# Patient Record
Sex: MALE | Race: WHITE | Employment: OTHER | URBAN - METROPOLITAN AREA
[De-identification: names, ages, dates, MRNs, and addresses within clinical notes are randomized per-mention and may not be internally consistent; named-entity substitution may affect disease eponyms.]

---

## 2020-03-17 ENCOUNTER — HOSPITAL ENCOUNTER (OUTPATIENT)
Age: 65
Setting detail: OBSERVATION
Discharge: NURSING, REHAB OR LONG-TERM CARE | End: 2020-03-20
Attending: EMERGENCY MEDICINE | Admitting: SURGERY
Payer: MEDICARE

## 2020-03-17 PROCEDURE — 84484 ASSAY OF TROPONIN QUANT: CPT

## 2020-03-17 PROCEDURE — 6360000002 HC RX W HCPCS: Performed by: EMERGENCY MEDICINE

## 2020-03-17 PROCEDURE — 83690 ASSAY OF LIPASE: CPT

## 2020-03-17 PROCEDURE — 83605 ASSAY OF LACTIC ACID: CPT

## 2020-03-17 PROCEDURE — 80053 COMPREHEN METABOLIC PANEL: CPT

## 2020-03-17 PROCEDURE — 93005 ELECTROCARDIOGRAM TRACING: CPT | Performed by: EMERGENCY MEDICINE

## 2020-03-17 PROCEDURE — 96375 TX/PRO/DX INJ NEW DRUG ADDON: CPT

## 2020-03-17 PROCEDURE — 85027 COMPLETE CBC AUTOMATED: CPT

## 2020-03-17 PROCEDURE — 96376 TX/PRO/DX INJ SAME DRUG ADON: CPT

## 2020-03-17 PROCEDURE — 99285 EMERGENCY DEPT VISIT HI MDM: CPT

## 2020-03-17 RX ORDER — ONDANSETRON 2 MG/ML
4 INJECTION INTRAMUSCULAR; INTRAVENOUS EVERY 6 HOURS PRN
Status: DISCONTINUED | OUTPATIENT
Start: 2020-03-17 | End: 2020-03-20 | Stop reason: HOSPADM

## 2020-03-17 RX ORDER — MORPHINE SULFATE 4 MG/ML
4 INJECTION, SOLUTION INTRAMUSCULAR; INTRAVENOUS ONCE
Status: COMPLETED | OUTPATIENT
Start: 2020-03-18 | End: 2020-03-17

## 2020-03-17 RX ORDER — MORPHINE SULFATE 4 MG/ML
4 INJECTION, SOLUTION INTRAMUSCULAR; INTRAVENOUS ONCE
Status: COMPLETED | OUTPATIENT
Start: 2020-03-17 | End: 2020-03-17

## 2020-03-17 RX ORDER — MORPHINE SULFATE 4 MG/ML
INJECTION, SOLUTION INTRAMUSCULAR; INTRAVENOUS
Status: DISPENSED
Start: 2020-03-17 | End: 2020-03-18

## 2020-03-17 RX ADMIN — MORPHINE SULFATE 4 MG: 4 INJECTION, SOLUTION INTRAMUSCULAR; INTRAVENOUS at 23:55

## 2020-03-17 RX ADMIN — ONDANSETRON 4 MG: 2 INJECTION INTRAMUSCULAR; INTRAVENOUS at 23:47

## 2020-03-17 RX ADMIN — MORPHINE SULFATE 4 MG: 4 INJECTION, SOLUTION INTRAMUSCULAR; INTRAVENOUS at 23:48

## 2020-03-17 ASSESSMENT — PAIN DESCRIPTION - DESCRIPTORS: DESCRIPTORS: SHOOTING

## 2020-03-17 ASSESSMENT — PAIN SCALES - GENERAL
PAINLEVEL_OUTOF10: 10

## 2020-03-17 ASSESSMENT — PAIN DESCRIPTION - LOCATION: LOCATION: BACK

## 2020-03-18 ENCOUNTER — APPOINTMENT (OUTPATIENT)
Dept: MRI IMAGING | Age: 65
End: 2020-03-18
Payer: MEDICARE

## 2020-03-18 ENCOUNTER — APPOINTMENT (OUTPATIENT)
Dept: GENERAL RADIOLOGY | Age: 65
End: 2020-03-18
Payer: MEDICARE

## 2020-03-18 ENCOUNTER — APPOINTMENT (OUTPATIENT)
Dept: CT IMAGING | Age: 65
End: 2020-03-18
Payer: MEDICARE
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
## 2020-03-18 LAB
ALBUMIN SERPL-MCNC: 3.6 G/DL (ref 3.5–5.2)
ALP BLD-CCNC: 239 U/L (ref 40–129)
ALT SERPL-CCNC: 10 U/L (ref 0–40)
AMORPHOUS: ABNORMAL
ANION GAP SERPL CALCULATED.3IONS-SCNC: 11 MMOL/L (ref 7–16)
AST SERPL-CCNC: 22 U/L (ref 0–39)
BACTERIA: ABNORMAL /HPF
BILIRUB SERPL-MCNC: 0.3 MG/DL (ref 0–1.2)
BILIRUBIN URINE: NEGATIVE
BLOOD, URINE: ABNORMAL
BUN BLDV-MCNC: 10 MG/DL (ref 8–23)
CALCIUM SERPL-MCNC: 9.4 MG/DL (ref 8.6–10.2)
CHLORIDE BLD-SCNC: 104 MMOL/L (ref 98–107)
CLARITY: CLEAR
CO2: 25 MMOL/L (ref 22–29)
COLOR: YELLOW
CREAT SERPL-MCNC: 1.1 MG/DL (ref 0.7–1.2)
EKG ATRIAL RATE: 80 BPM
EKG P AXIS: 49 DEGREES
EKG P-R INTERVAL: 160 MS
EKG Q-T INTERVAL: 400 MS
EKG QRS DURATION: 86 MS
EKG QTC CALCULATION (BAZETT): 461 MS
EKG R AXIS: 51 DEGREES
EKG T AXIS: 27 DEGREES
EKG VENTRICULAR RATE: 80 BPM
GFR AFRICAN AMERICAN: >60
GFR NON-AFRICAN AMERICAN: >60 ML/MIN/1.73
GLUCOSE BLD-MCNC: 122 MG/DL (ref 74–99)
GLUCOSE URINE: NEGATIVE MG/DL
HCT VFR BLD CALC: 31.9 % (ref 37–54)
HEMOGLOBIN: 9.6 G/DL (ref 12.5–16.5)
KETONES, URINE: NEGATIVE MG/DL
LACTIC ACID: 1.8 MMOL/L (ref 0.5–2.2)
LEUKOCYTE ESTERASE, URINE: ABNORMAL
LIPASE: 79 U/L (ref 13–60)
MCH RBC QN AUTO: 24.6 PG (ref 26–35)
MCHC RBC AUTO-ENTMCNC: 30.1 % (ref 32–34.5)
MCV RBC AUTO: 81.6 FL (ref 80–99.9)
NITRITE, URINE: POSITIVE
PDW BLD-RTO: 13.2 FL (ref 11.5–15)
PH UA: >=9 (ref 5–9)
PLATELET # BLD: 223 E9/L (ref 130–450)
PMV BLD AUTO: 10.5 FL (ref 7–12)
POTASSIUM SERPL-SCNC: 4 MMOL/L (ref 3.5–5)
PROTEIN UA: NEGATIVE MG/DL
RBC # BLD: 3.91 E12/L (ref 3.8–5.8)
RBC UA: ABNORMAL /HPF (ref 0–2)
SODIUM BLD-SCNC: 140 MMOL/L (ref 132–146)
SPECIFIC GRAVITY UA: 1.01 (ref 1–1.03)
TOTAL PROTEIN: 6.7 G/DL (ref 6.4–8.3)
TROPONIN: <0.01 NG/ML (ref 0–0.03)
UROBILINOGEN, URINE: 0.2 E.U./DL
WBC # BLD: 5.7 E9/L (ref 4.5–11.5)
WBC UA: ABNORMAL /HPF (ref 0–5)

## 2020-03-18 PROCEDURE — 72125 CT NECK SPINE W/O DYE: CPT

## 2020-03-18 PROCEDURE — 96375 TX/PRO/DX INJ NEW DRUG ADDON: CPT

## 2020-03-18 PROCEDURE — 81001 URINALYSIS AUTO W/SCOPE: CPT

## 2020-03-18 PROCEDURE — 6360000004 HC RX CONTRAST MEDICATION: Performed by: RADIOLOGY

## 2020-03-18 PROCEDURE — 6370000000 HC RX 637 (ALT 250 FOR IP): Performed by: SURGERY

## 2020-03-18 PROCEDURE — G0378 HOSPITAL OBSERVATION PER HR: HCPCS

## 2020-03-18 PROCEDURE — 96376 TX/PRO/DX INJ SAME DRUG ADON: CPT

## 2020-03-18 PROCEDURE — 6360000002 HC RX W HCPCS: Performed by: SURGERY

## 2020-03-18 PROCEDURE — 70450 CT HEAD/BRAIN W/O DYE: CPT

## 2020-03-18 PROCEDURE — 99219 PR INITIAL OBSERVATION CARE/DAY 50 MINUTES: CPT | Performed by: SURGERY

## 2020-03-18 PROCEDURE — 72141 MRI NECK SPINE W/O DYE: CPT

## 2020-03-18 PROCEDURE — 72131 CT LUMBAR SPINE W/O DYE: CPT

## 2020-03-18 PROCEDURE — 6360000002 HC RX W HCPCS: Performed by: EMERGENCY MEDICINE

## 2020-03-18 PROCEDURE — 96365 THER/PROPH/DIAG IV INF INIT: CPT

## 2020-03-18 PROCEDURE — 72128 CT CHEST SPINE W/O DYE: CPT

## 2020-03-18 PROCEDURE — 74177 CT ABD & PELVIS W/CONTRAST: CPT

## 2020-03-18 PROCEDURE — 6360000002 HC RX W HCPCS: Performed by: NURSE PRACTITIONER

## 2020-03-18 PROCEDURE — 2580000003 HC RX 258: Performed by: SURGERY

## 2020-03-18 PROCEDURE — 6360000002 HC RX W HCPCS: Performed by: STUDENT IN AN ORGANIZED HEALTH CARE EDUCATION/TRAINING PROGRAM

## 2020-03-18 PROCEDURE — 87088 URINE BACTERIA CULTURE: CPT

## 2020-03-18 PROCEDURE — 72148 MRI LUMBAR SPINE W/O DYE: CPT

## 2020-03-18 PROCEDURE — 71045 X-RAY EXAM CHEST 1 VIEW: CPT

## 2020-03-18 PROCEDURE — 72146 MRI CHEST SPINE W/O DYE: CPT

## 2020-03-18 RX ORDER — PERPHENAZINE 2 MG/1
2 TABLET, FILM COATED ORAL 2 TIMES DAILY
Status: DISCONTINUED | OUTPATIENT
Start: 2020-03-18 | End: 2020-03-20 | Stop reason: HOSPADM

## 2020-03-18 RX ORDER — TAMSULOSIN HYDROCHLORIDE 0.4 MG/1
0.4 CAPSULE ORAL DAILY
Status: DISCONTINUED | OUTPATIENT
Start: 2020-03-19 | End: 2020-03-20 | Stop reason: HOSPADM

## 2020-03-18 RX ORDER — PERPHENAZINE 2 MG/1
2 TABLET, FILM COATED ORAL 2 TIMES DAILY
COMMUNITY

## 2020-03-18 RX ORDER — GABAPENTIN 600 MG/1
1200 TABLET ORAL 3 TIMES DAILY
COMMUNITY

## 2020-03-18 RX ORDER — M-VIT,TX,IRON,MINS/CALC/FOLIC 27MG-0.4MG
1 TABLET ORAL DAILY
COMMUNITY

## 2020-03-18 RX ORDER — MORPHINE SULFATE 4 MG/ML
4 INJECTION, SOLUTION INTRAMUSCULAR; INTRAVENOUS ONCE
Status: COMPLETED | OUTPATIENT
Start: 2020-03-18 | End: 2020-03-18

## 2020-03-18 RX ORDER — PROMETHAZINE HYDROCHLORIDE 25 MG/1
12.5 TABLET ORAL EVERY 6 HOURS PRN
Status: DISCONTINUED | OUTPATIENT
Start: 2020-03-18 | End: 2020-03-20 | Stop reason: HOSPADM

## 2020-03-18 RX ORDER — ALPRAZOLAM 1 MG/1
2 TABLET ORAL 3 TIMES DAILY
COMMUNITY

## 2020-03-18 RX ORDER — SENNA AND DOCUSATE SODIUM 50; 8.6 MG/1; MG/1
1 TABLET, FILM COATED ORAL 2 TIMES DAILY
COMMUNITY

## 2020-03-18 RX ORDER — SENNA AND DOCUSATE SODIUM 50; 8.6 MG/1; MG/1
1 TABLET, FILM COATED ORAL 2 TIMES DAILY
Status: DISCONTINUED | OUTPATIENT
Start: 2020-03-18 | End: 2020-03-18 | Stop reason: SDUPTHER

## 2020-03-18 RX ORDER — ROSUVASTATIN CALCIUM 20 MG/1
20 TABLET, COATED ORAL NIGHTLY
Status: DISCONTINUED | OUTPATIENT
Start: 2020-03-18 | End: 2020-03-20 | Stop reason: HOSPADM

## 2020-03-18 RX ORDER — DULOXETIN HYDROCHLORIDE 60 MG/1
60 CAPSULE, DELAYED RELEASE ORAL DAILY
COMMUNITY

## 2020-03-18 RX ORDER — FLUTICASONE PROPIONATE 50 MCG
1 SPRAY, SUSPENSION (ML) NASAL DAILY
Status: DISCONTINUED | OUTPATIENT
Start: 2020-03-19 | End: 2020-03-20 | Stop reason: HOSPADM

## 2020-03-18 RX ORDER — BACLOFEN 10 MG/1
10 TABLET ORAL 3 TIMES DAILY
COMMUNITY

## 2020-03-18 RX ORDER — FAMOTIDINE 20 MG/1
20 TABLET, FILM COATED ORAL 2 TIMES DAILY
COMMUNITY

## 2020-03-18 RX ORDER — ACETAMINOPHEN 325 MG/1
650 TABLET ORAL EVERY 6 HOURS
Status: DISCONTINUED | OUTPATIENT
Start: 2020-03-18 | End: 2020-03-20 | Stop reason: HOSPADM

## 2020-03-18 RX ORDER — CARVEDILOL 3.12 MG/1
3.12 TABLET ORAL 2 TIMES DAILY
COMMUNITY

## 2020-03-18 RX ORDER — OXYCODONE HYDROCHLORIDE 10 MG/1
10 TABLET ORAL EVERY 4 HOURS PRN
Status: DISCONTINUED | OUTPATIENT
Start: 2020-03-18 | End: 2020-03-19

## 2020-03-18 RX ORDER — ALBUTEROL SULFATE 90 UG/1
2 AEROSOL, METERED RESPIRATORY (INHALATION) EVERY 4 HOURS PRN
Status: DISCONTINUED | OUTPATIENT
Start: 2020-03-18 | End: 2020-03-20 | Stop reason: HOSPADM

## 2020-03-18 RX ORDER — SENNA AND DOCUSATE SODIUM 50; 8.6 MG/1; MG/1
1 TABLET, FILM COATED ORAL 2 TIMES DAILY
Status: DISCONTINUED | OUTPATIENT
Start: 2020-03-18 | End: 2020-03-20 | Stop reason: HOSPADM

## 2020-03-18 RX ORDER — IBUPROFEN 400 MG/1
400 TABLET ORAL EVERY 6 HOURS PRN
Status: DISCONTINUED | OUTPATIENT
Start: 2020-03-18 | End: 2020-03-20 | Stop reason: HOSPADM

## 2020-03-18 RX ORDER — METHOCARBAMOL 750 MG/1
1500 TABLET, FILM COATED ORAL 4 TIMES DAILY
Status: DISCONTINUED | OUTPATIENT
Start: 2020-03-18 | End: 2020-03-20 | Stop reason: HOSPADM

## 2020-03-18 RX ORDER — DULOXETIN HYDROCHLORIDE 60 MG/1
60 CAPSULE, DELAYED RELEASE ORAL DAILY
Status: DISCONTINUED | OUTPATIENT
Start: 2020-03-19 | End: 2020-03-20 | Stop reason: HOSPADM

## 2020-03-18 RX ORDER — SODIUM CHLORIDE 9 MG/ML
INJECTION, SOLUTION INTRAVENOUS CONTINUOUS
Status: DISCONTINUED | OUTPATIENT
Start: 2020-03-18 | End: 2020-03-18

## 2020-03-18 RX ORDER — M-VIT,TX,IRON,MINS/CALC/FOLIC 27MG-0.4MG
1 TABLET ORAL DAILY
Status: DISCONTINUED | OUTPATIENT
Start: 2020-03-19 | End: 2020-03-20 | Stop reason: HOSPADM

## 2020-03-18 RX ORDER — IBUPROFEN 400 MG/1
400 TABLET ORAL EVERY 6 HOURS PRN
COMMUNITY

## 2020-03-18 RX ORDER — TAMSULOSIN HYDROCHLORIDE 0.4 MG/1
0.4 CAPSULE ORAL DAILY
COMMUNITY

## 2020-03-18 RX ORDER — FLUTICASONE PROPIONATE 50 MCG
1 SPRAY, SUSPENSION (ML) NASAL DAILY
COMMUNITY

## 2020-03-18 RX ORDER — CARVEDILOL 3.12 MG/1
3.12 TABLET ORAL 2 TIMES DAILY
Status: DISCONTINUED | OUTPATIENT
Start: 2020-03-18 | End: 2020-03-20 | Stop reason: HOSPADM

## 2020-03-18 RX ORDER — ALLOPURINOL 100 MG/1
100 TABLET ORAL DAILY
COMMUNITY

## 2020-03-18 RX ORDER — PREDNISONE 10 MG/1
10 TABLET ORAL DAILY
COMMUNITY

## 2020-03-18 RX ORDER — ALBUTEROL SULFATE 90 UG/1
2 AEROSOL, METERED RESPIRATORY (INHALATION) EVERY 4 HOURS PRN
COMMUNITY

## 2020-03-18 RX ORDER — GABAPENTIN 600 MG/1
1200 TABLET ORAL 3 TIMES DAILY
Status: DISCONTINUED | OUTPATIENT
Start: 2020-03-18 | End: 2020-03-20 | Stop reason: HOSPADM

## 2020-03-18 RX ORDER — SODIUM CHLORIDE 0.9 % (FLUSH) 0.9 %
10 SYRINGE (ML) INJECTION EVERY 12 HOURS SCHEDULED
Status: DISCONTINUED | OUTPATIENT
Start: 2020-03-18 | End: 2020-03-20 | Stop reason: HOSPADM

## 2020-03-18 RX ORDER — NITROFURANTOIN 25; 75 MG/1; MG/1
100 CAPSULE ORAL DAILY
COMMUNITY

## 2020-03-18 RX ORDER — LEVETIRACETAM 500 MG/1
500 TABLET ORAL DAILY
COMMUNITY

## 2020-03-18 RX ORDER — ALLOPURINOL 100 MG/1
100 TABLET ORAL DAILY
Status: DISCONTINUED | OUTPATIENT
Start: 2020-03-19 | End: 2020-03-20 | Stop reason: HOSPADM

## 2020-03-18 RX ORDER — BACLOFEN 10 MG/1
10 TABLET ORAL 3 TIMES DAILY
Status: DISCONTINUED | OUTPATIENT
Start: 2020-03-18 | End: 2020-03-20 | Stop reason: HOSPADM

## 2020-03-18 RX ORDER — ALPRAZOLAM 1 MG/1
2 TABLET ORAL 3 TIMES DAILY
Status: DISCONTINUED | OUTPATIENT
Start: 2020-03-18 | End: 2020-03-20 | Stop reason: HOSPADM

## 2020-03-18 RX ORDER — SUCRALFATE 1 G/1
1 TABLET ORAL 4 TIMES DAILY
COMMUNITY

## 2020-03-18 RX ORDER — POLYETHYLENE GLYCOL 3350 17 G/17G
17 POWDER, FOR SOLUTION ORAL DAILY PRN
Status: DISCONTINUED | OUTPATIENT
Start: 2020-03-18 | End: 2020-03-20 | Stop reason: HOSPADM

## 2020-03-18 RX ORDER — LORAZEPAM 2 MG/ML
1 INJECTION INTRAMUSCULAR ONCE
Status: DISCONTINUED | OUTPATIENT
Start: 2020-03-18 | End: 2020-03-20 | Stop reason: HOSPADM

## 2020-03-18 RX ORDER — FAMOTIDINE 20 MG/1
20 TABLET, FILM COATED ORAL 2 TIMES DAILY
Status: DISCONTINUED | OUTPATIENT
Start: 2020-03-18 | End: 2020-03-20 | Stop reason: HOSPADM

## 2020-03-18 RX ORDER — SUCRALFATE 1 G/1
1 TABLET ORAL 4 TIMES DAILY
Status: DISCONTINUED | OUTPATIENT
Start: 2020-03-18 | End: 2020-03-20 | Stop reason: HOSPADM

## 2020-03-18 RX ORDER — LANOLIN ALCOHOL/MO/W.PET/CERES
1000 CREAM (GRAM) TOPICAL DAILY
Status: DISCONTINUED | OUTPATIENT
Start: 2020-03-19 | End: 2020-03-20 | Stop reason: HOSPADM

## 2020-03-18 RX ORDER — ONDANSETRON 2 MG/ML
4 INJECTION INTRAMUSCULAR; INTRAVENOUS EVERY 6 HOURS PRN
Status: DISCONTINUED | OUTPATIENT
Start: 2020-03-18 | End: 2020-03-20 | Stop reason: HOSPADM

## 2020-03-18 RX ORDER — SODIUM CHLORIDE 0.9 % (FLUSH) 0.9 %
10 SYRINGE (ML) INJECTION PRN
Status: DISCONTINUED | OUTPATIENT
Start: 2020-03-18 | End: 2020-03-20 | Stop reason: HOSPADM

## 2020-03-18 RX ORDER — LORAZEPAM 2 MG/ML
1 INJECTION INTRAMUSCULAR ONCE
Status: COMPLETED | OUTPATIENT
Start: 2020-03-18 | End: 2020-03-18

## 2020-03-18 RX ORDER — LEVETIRACETAM 500 MG/1
500 TABLET ORAL DAILY
Status: DISCONTINUED | OUTPATIENT
Start: 2020-03-19 | End: 2020-03-20 | Stop reason: HOSPADM

## 2020-03-18 RX ORDER — POLYETHYLENE GLYCOL 3350 17 G/17G
17 POWDER, FOR SOLUTION ORAL DAILY
Status: DISCONTINUED | OUTPATIENT
Start: 2020-03-18 | End: 2020-03-20 | Stop reason: HOSPADM

## 2020-03-18 RX ORDER — OXYCODONE HYDROCHLORIDE 5 MG/1
5 TABLET ORAL EVERY 4 HOURS PRN
Status: DISCONTINUED | OUTPATIENT
Start: 2020-03-18 | End: 2020-03-20 | Stop reason: HOSPADM

## 2020-03-18 RX ADMIN — LORAZEPAM 1 MG: 2 INJECTION, SOLUTION INTRAMUSCULAR; INTRAVENOUS at 06:35

## 2020-03-18 RX ADMIN — METHOCARBAMOL TABLETS 1500 MG: 750 TABLET, COATED ORAL at 14:38

## 2020-03-18 RX ADMIN — SODIUM CHLORIDE, PRESERVATIVE FREE 10 ML: 5 INJECTION INTRAVENOUS at 10:41

## 2020-03-18 RX ADMIN — SENNOSIDES AND DOCUSATE SODIUM 1 TABLET: 8.6; 5 TABLET ORAL at 10:39

## 2020-03-18 RX ADMIN — OXYCODONE HYDROCHLORIDE 10 MG: 10 TABLET ORAL at 18:36

## 2020-03-18 RX ADMIN — ACETAMINOPHEN 650 MG: 325 TABLET ORAL at 21:08

## 2020-03-18 RX ADMIN — OXYCODONE HYDROCHLORIDE 10 MG: 10 TABLET ORAL at 22:40

## 2020-03-18 RX ADMIN — ALPRAZOLAM 2 MG: 1 TABLET ORAL at 22:40

## 2020-03-18 RX ADMIN — ACETAMINOPHEN 650 MG: 325 TABLET ORAL at 10:39

## 2020-03-18 RX ADMIN — OXYCODONE HYDROCHLORIDE 10 MG: 10 TABLET ORAL at 14:57

## 2020-03-18 RX ADMIN — OXYCODONE HYDROCHLORIDE 10 MG: 10 TABLET ORAL at 10:39

## 2020-03-18 RX ADMIN — POLYETHYLENE GLYCOL 3350 17 G: 17 POWDER, FOR SOLUTION ORAL at 10:39

## 2020-03-18 RX ADMIN — SUCRALFATE 1 G: 1 TABLET ORAL at 22:40

## 2020-03-18 RX ADMIN — MORPHINE SULFATE 4 MG: 4 INJECTION, SOLUTION INTRAMUSCULAR; INTRAVENOUS at 08:00

## 2020-03-18 RX ADMIN — ACETAMINOPHEN 650 MG: 325 TABLET ORAL at 16:51

## 2020-03-18 RX ADMIN — SODIUM CHLORIDE: 9 INJECTION, SOLUTION INTRAVENOUS at 10:39

## 2020-03-18 RX ADMIN — IOPAMIDOL 110 ML: 755 INJECTION, SOLUTION INTRAVENOUS at 01:12

## 2020-03-18 RX ADMIN — MORPHINE SULFATE 4 MG: 4 INJECTION, SOLUTION INTRAMUSCULAR; INTRAVENOUS at 03:38

## 2020-03-18 RX ADMIN — METHOCARBAMOL TABLETS 1500 MG: 750 TABLET, COATED ORAL at 16:51

## 2020-03-18 RX ADMIN — METHOCARBAMOL TABLETS 1500 MG: 750 TABLET, COATED ORAL at 21:08

## 2020-03-18 RX ADMIN — GABAPENTIN 1200 MG: 600 TABLET, FILM COATED ORAL at 22:39

## 2020-03-18 RX ADMIN — FAMOTIDINE 20 MG: 20 TABLET, FILM COATED ORAL at 22:40

## 2020-03-18 RX ADMIN — METHOCARBAMOL 1000 MG: 100 INJECTION, SOLUTION INTRAMUSCULAR; INTRAVENOUS at 04:08

## 2020-03-18 RX ADMIN — SODIUM CHLORIDE, PRESERVATIVE FREE 10 ML: 5 INJECTION INTRAVENOUS at 21:08

## 2020-03-18 ASSESSMENT — PAIN SCALES - GENERAL
PAINLEVEL_OUTOF10: 7
PAINLEVEL_OUTOF10: 7
PAINLEVEL_OUTOF10: 10
PAINLEVEL_OUTOF10: 10
PAINLEVEL_OUTOF10: 8
PAINLEVEL_OUTOF10: 8
PAINLEVEL_OUTOF10: 0
PAINLEVEL_OUTOF10: 8
PAINLEVEL_OUTOF10: 8
PAINLEVEL_OUTOF10: 7
PAINLEVEL_OUTOF10: 10
PAINLEVEL_OUTOF10: 7
PAINLEVEL_OUTOF10: 8
PAINLEVEL_OUTOF10: 8

## 2020-03-18 ASSESSMENT — PAIN DESCRIPTION - FREQUENCY
FREQUENCY: CONTINUOUS

## 2020-03-18 ASSESSMENT — PAIN DESCRIPTION - DESCRIPTORS
DESCRIPTORS: DISCOMFORT;CONSTANT;DULL
DESCRIPTORS: SHARP;ACHING
DESCRIPTORS: DISCOMFORT;CONSTANT
DESCRIPTORS: ACHING;THROBBING
DESCRIPTORS: DISCOMFORT;CONSTANT;ACHING
DESCRIPTORS: ACHING;RADIATING;SORE
DESCRIPTORS: ACHING;DISCOMFORT;SORE
DESCRIPTORS: ACHING;CONSTANT;DISCOMFORT

## 2020-03-18 ASSESSMENT — PAIN DESCRIPTION - ONSET
ONSET: ON-GOING

## 2020-03-18 ASSESSMENT — PAIN DESCRIPTION - LOCATION
LOCATION: BACK;NECK
LOCATION: BACK
LOCATION: BACK;NECK

## 2020-03-18 ASSESSMENT — PAIN DESCRIPTION - PROGRESSION
CLINICAL_PROGRESSION: NOT CHANGED

## 2020-03-18 ASSESSMENT — PAIN DESCRIPTION - PAIN TYPE
TYPE: CHRONIC PAIN
TYPE: ACUTE PAIN
TYPE: CHRONIC PAIN

## 2020-03-18 NOTE — PROGRESS NOTES
Trauma Tertiary Survey    Admit Date: 3/17/2020  Hospital day 1    Other fall from wheelchair    CC:  Back pain, right thigh pain       Past Medical History:   Diagnosis Date    Kidney stones     MI, old     Paraplegia ([facility] [address]        Alcohol pre-screening:  Men: How many times in the past year have you had 5 or more drinks in a day?  none    Scheduled Meds:   sodium chloride flush  10 mL Intravenous 2 times per day    acetaminophen  650 mg Oral Q6H    methocarbamol  1,500 mg Oral 4x Daily    sennosides-docusate sodium  1 tablet Oral BID    polyethylene glycol  17 g Oral Daily    LORazepam  1 mg Intravenous Once     Continuous Infusions:  PRN Meds:sodium chloride flush, polyethylene glycol, promethazine **OR** ondansetron, oxyCODONE **OR** oxyCODONE, ondansetron    Subjective:   Frustrated about his care at his group home, states repeatedly that he wants to move. He complains of arm weakness, severe neck and low back pain, and pain in his right thigh. He states his chronic indwelling figueroa was supposed to be changed 3 weeks ago by his group home but was not; had been placed by his urologist 4 weeks prior to that. Objective:     Patient Vitals for the past 8 hrs:   BP Temp Temp src Pulse SpO2   03/18/20 1646 126/74 97.9 °F (36.6 °C) Temporal 95 96 %       I/O last 3 completed shifts: In: 10 [I.V.:10]  Out: 700 [Urine:700]  No intake/output data recorded. Past Medical History:   Diagnosis Date    Kidney stones     MI, old     Paraplegia [facility])        Radiology:  MRI THORACIC SPINE WO CONTRAST   Final Result   No evidence of an acute osseous injury. 2. Calcified right-sided disc protrusion at T10-11 encroaching on the   lateral recess. Such lesions are usually nonacute. MRI LUMBAR SPINE WO CONTRAST   Final Result   Degenerative spondylotic changes. No fracture or dislocation. No other   significant abnormal findings. MRI CERVICAL SPINE WO CONTRAST   Final Result      1. No acute fracture. Left decreased Right decreased  Wiggles toes: Left No    Right No  Plantar flexion: Left no  Right no    BP (!) 100/57   Pulse 91   Temp 99.1 °F (37.3 °C) (Temporal)   Resp 18   Ht 5' 8\" (1.727 m)   Wt 215 lb (97.5 kg)   SpO2 95%   BMI 32.69 kg/m²     General appearance: awake, conversant, laying in bed in no distress. Tangential answers to any questions  HEENT: collar in place  Lungs: nonlabored breathing on room air   Heart: regular rate  Abdomen: soft, non-tender, non distended  Skin: No wounds or bruising noted  Neurologic: Alert and oriented x 3. Grossly normal  Musculoskeletal: Gestures normally with upper extremities when talking but weak hand grasp and upper extremity strength when prompted. No motor function lower extremities, at baseline  : figueroa present on arrival from facility, cloud urine     Spine:     Spine Tenderness ROM   Cervical 4 /10 Not Indicated   Thoracic 1 /10 Not Indicated   Lumbar 5 /10 Not Indicated     Musculoskeletal    Joint Tenderness Swelling ROM   Right shoulder absent absent normal   Left shoulder absent absent normal   Right elbow absent absent normal   Left elbow absent absent normal   Right wrist absent absent normal   Left wrist absent absent normal   Right hand grasp absent absent abnormal - decreased   Left hand grasp absent absent abnormal - decreased   Right hip present absent abnormal   Left hip absent absent abnormal   Right knee absent absent abnormal   Left knee absent absent abnormal   Right ankle absent absent abnormal   Left ankle absent absent abnormal - absent   Right foot absent absent abnormal - absent   Left foot absent absent abnormal - absent       CONSULTS: Urology    PROCEDURES: none    INJURIES:  Cervical strain      Active Problems:    Accidental fall from chair    Head injury  Resolved Problems:    * No resolved hospital problems. *        Assessment/Plan:       · Neuro:  GCS 15. History of chronic pain. Pain control.  Upper extremity weakness

## 2020-03-18 NOTE — CARE COORDINATION
3/18, OSKAR made referral to Dulce Maria Gardiner at [facility] on patient. Dulce Maria Gardiner to take a look at patient and let OSKAR know on referral.  OSKAR/FRANCINE to follow.
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
## 2020-03-18 NOTE — ED NOTES
Patient screaming \"I was told I was getting pain medication over an hour ago\". Informed patient that I will get the medication.       Gale Luis RN  03/18/20 9932

## 2020-03-18 NOTE — CARE COORDINATION
Transition of care at discharge: Patient is a resident of [address] [place] [address],[address]. Message left for Jazmín Brown to return call in order to discuss discharge plan.

## 2020-03-18 NOTE — PROGRESS NOTES
Date: 3/18/2020       Patient Name: Tonio White  : 1955      MRN: [id number]    PT order received and chart reviewed. Pt declining physical therapy evaluation.    Will follow up as appropriate    Suzie Pineda PT, DPT  [id number]

## 2020-03-18 NOTE — CONSULTS
3/18/2020 6:54 PM  Service: Urology  Group: [facility]    Osiel Herndon  [id number]     Chief Complaint:  Figueroa exchange, history of urethral stricture dilation in February     History of Present Illness: The patient is a 60 y.o. male patient who was admitted to the hospital one day ago after falling out of his wheelchair. He has a history of paraplegia and neurogenic bladder with indwelling figueroa catheter. We were asked to see him for figueroa catheter exchange. He has history of performing intermittent self catheterization in the past. He states he has seen Urology in [place], as well as Dr. Justyna Patel in [place]. He was recently admitted in February for UTI and sepsis. He underwent cystoscopy, urethral dilation, and figueroa catheter placement with Dr. Justyna Patel on 2/5/20 and states his figueroa was due to be changed about 3 weeks ago. Past Medical History:   Diagnosis Date    Kidney stones     MI, old     Paraplegia ([facility] [address]          Past Surgical History:   Procedure Laterality Date    BACK SURGERY         Medications Prior to Admission:    Medications Prior to Admission: senna-docusate (PERICOLACE) 8.6-50 MG per tablet, Take 1 tablet by mouth daily. NONFORMULARY,     Allergies:    Patient has no known allergies. Social History:    reports that he has never smoked. He has never used smokeless tobacco. He reports that he does not drink alcohol or use drugs. Family History:   Non-contributory to this Urological problem  family history is not on file.     Review of Systems:  Constitutional: No fever or chills   Respiratory: negative for cough and hemoptysis  Cardiovascular: negative for chest pain and dyspnea  Gastrointestinal: negative for abdominal pain, diarrhea, nausea and vomiting   Derm: negative for rash and skin lesion(s)  Neurological: negative for seizures and tremors  Musculoskeletal: paraplegia     Psychiatric: Negative   : As above in the HPI, otherwise negative  All other reviews are negative    Physical Exam:     Vitals:  /74   Pulse 95   Temp 97.9 °F (36.6 °C) (Temporal)   Resp 20   Ht 5' 8\" (1.727 m)   Wt 215 lb (97.5 kg)   SpO2 96%   BMI 32.69 kg/m²     General:  Awake, alert, oriented X 3. No apparent distress. HEENT:  Normocephalic, c-collar   Lungs:  Respirations symmetric and non-labored. Abdomen:  soft, nontender, no masses  Extremities:  No clubbing, cyanosis, or edema  Skin:  Warm and dry, no open lesions or rashes  Neuro: paraplegia    Rectal: deferred  Genitourinary: Figueroa intact draining clear yellow urine, appears to have meatotomy     Labs:     Recent Labs     03/17/20  2338   WBC 5.7   RBC 3.91   HGB 9.6*   HCT 31.9*   MCV 81.6   MCH 24.6*   MCHC 30.1*   RDW 13.2      MPV 10.5         Recent Labs     03/17/20  2338   CREATININE 1.1       Imaging:             Procedures:   10cc of water was removed from figueroa balloon and figueroa was removed. The genitalia were prep and draped in sterile fashion, a Urojet was inserted into the urethra and a #18F coude catheter was inserted into the bladder. This was irrigated for and clear yellow urine returned. He tolerated the procedure well     Assessment/plan:  Neurogenic bladder with indwelling figueroa  Hx of urethral stricture s/p cystoscopy, urethral dilation, and figueroa placement on 2/5/20 with Dr. Charlies Fothergill    Urine culture ordered   No leukocytosis, no fever, recommend treatment based off of positive culture results. CT reviewed, no obstructive uropathy   Bladder scan ordered to ensure no retention with figueroa placement   Continue figueroa catheter  This will continue to be changed every 4 weeks. We will cont to follow               Electronically signed by  on 3/18/2020 at 6:54 PM   I interviewed and examined the patient and feel that the catheter is well-positioned  I agree with the above note and plan by nurse Cintia Shafer.

## 2020-03-18 NOTE — PROGRESS NOTES
Occupational Therapy          OT consult received and appreciated. Chart reviewed. Will hold evaluation due to patient adamantly refusing to work with therapy due to UTI and pain . Will evaluate at a later time. Thank you.  Angel Luis Batista, OTR/L #[id number]

## 2020-03-19 LAB
ALBUMIN SERPL-MCNC: 3.2 G/DL (ref 3.5–5.2)
ALP BLD-CCNC: 233 U/L (ref 40–129)
ALT SERPL-CCNC: 10 U/L (ref 0–40)
ANION GAP SERPL CALCULATED.3IONS-SCNC: 10 MMOL/L (ref 7–16)
AST SERPL-CCNC: 18 U/L (ref 0–39)
BILIRUB SERPL-MCNC: 0.3 MG/DL (ref 0–1.2)
BUN BLDV-MCNC: 10 MG/DL (ref 8–23)
CALCIUM SERPL-MCNC: 8.8 MG/DL (ref 8.6–10.2)
CHLORIDE BLD-SCNC: 105 MMOL/L (ref 98–107)
CO2: 26 MMOL/L (ref 22–29)
CREAT SERPL-MCNC: 1 MG/DL (ref 0.7–1.2)
GFR AFRICAN AMERICAN: >60
GFR NON-AFRICAN AMERICAN: >60 ML/MIN/1.73
GLUCOSE BLD-MCNC: 87 MG/DL (ref 74–99)
HCT VFR BLD CALC: 31.8 % (ref 37–54)
HEMOGLOBIN: 9.4 G/DL (ref 12.5–16.5)
MCH RBC QN AUTO: 25 PG (ref 26–35)
MCHC RBC AUTO-ENTMCNC: 29.6 % (ref 32–34.5)
MCV RBC AUTO: 84.6 FL (ref 80–99.9)
PDW BLD-RTO: 13.2 FL (ref 11.5–15)
PLATELET # BLD: 192 E9/L (ref 130–450)
PMV BLD AUTO: 10.4 FL (ref 7–12)
POTASSIUM REFLEX MAGNESIUM: 3.6 MMOL/L (ref 3.5–5)
RBC # BLD: 3.76 E12/L (ref 3.8–5.8)
SODIUM BLD-SCNC: 141 MMOL/L (ref 132–146)
TOTAL PROTEIN: 6 G/DL (ref 6.4–8.3)
WBC # BLD: 4.6 E9/L (ref 4.5–11.5)

## 2020-03-19 PROCEDURE — G0378 HOSPITAL OBSERVATION PER HR: HCPCS

## 2020-03-19 PROCEDURE — 99224 PR SBSQ OBSERVATION CARE/DAY 15 MINUTES: CPT | Performed by: SURGERY

## 2020-03-19 PROCEDURE — 6370000000 HC RX 637 (ALT 250 FOR IP): Performed by: SURGERY

## 2020-03-19 PROCEDURE — 36415 COLL VENOUS BLD VENIPUNCTURE: CPT

## 2020-03-19 PROCEDURE — 97161 PT EVAL LOW COMPLEX 20 MIN: CPT

## 2020-03-19 PROCEDURE — 80053 COMPREHEN METABOLIC PANEL: CPT

## 2020-03-19 PROCEDURE — 85027 COMPLETE CBC AUTOMATED: CPT

## 2020-03-19 PROCEDURE — 51798 US URINE CAPACITY MEASURE: CPT

## 2020-03-19 PROCEDURE — 2580000003 HC RX 258: Performed by: SURGERY

## 2020-03-19 PROCEDURE — 97165 OT EVAL LOW COMPLEX 30 MIN: CPT

## 2020-03-19 RX ADMIN — ACETAMINOPHEN 650 MG: 325 TABLET ORAL at 09:59

## 2020-03-19 RX ADMIN — OXYCODONE HYDROCHLORIDE 10 MG: 10 TABLET ORAL at 04:44

## 2020-03-19 RX ADMIN — ALPRAZOLAM 2 MG: 1 TABLET ORAL at 09:08

## 2020-03-19 RX ADMIN — METHOCARBAMOL TABLETS 1500 MG: 750 TABLET, COATED ORAL at 21:44

## 2020-03-19 RX ADMIN — SODIUM CHLORIDE, PRESERVATIVE FREE 10 ML: 5 INJECTION INTRAVENOUS at 21:44

## 2020-03-19 RX ADMIN — GABAPENTIN 1200 MG: 600 TABLET, FILM COATED ORAL at 21:45

## 2020-03-19 RX ADMIN — FAMOTIDINE 20 MG: 20 TABLET, FILM COATED ORAL at 21:43

## 2020-03-19 RX ADMIN — OXYCODONE 5 MG: 5 TABLET ORAL at 21:43

## 2020-03-19 RX ADMIN — PERPHENAZINE 2 MG: 2 TABLET, FILM COATED ORAL at 21:44

## 2020-03-19 RX ADMIN — PERPHENAZINE 2 MG: 2 TABLET, FILM COATED ORAL at 09:14

## 2020-03-19 RX ADMIN — SUCRALFATE 1 G: 1 TABLET ORAL at 09:12

## 2020-03-19 RX ADMIN — TAMSULOSIN HYDROCHLORIDE 0.4 MG: 0.4 CAPSULE ORAL at 09:11

## 2020-03-19 RX ADMIN — OXYCODONE HYDROCHLORIDE 10 MG: 10 TABLET ORAL at 17:50

## 2020-03-19 RX ADMIN — METHOCARBAMOL TABLETS 1500 MG: 750 TABLET, COATED ORAL at 17:56

## 2020-03-19 RX ADMIN — ACETAMINOPHEN 650 MG: 325 TABLET ORAL at 15:16

## 2020-03-19 RX ADMIN — CARVEDILOL 3.12 MG: 3.12 TABLET, FILM COATED ORAL at 09:12

## 2020-03-19 RX ADMIN — ROSUVASTATIN 20 MG: 20 TABLET, FILM COATED ORAL at 21:45

## 2020-03-19 RX ADMIN — BACLOFEN 10 MG: 10 TABLET ORAL at 21:43

## 2020-03-19 RX ADMIN — SUCRALFATE 1 G: 1 TABLET ORAL at 17:56

## 2020-03-19 RX ADMIN — Medication 1000 MCG: at 09:12

## 2020-03-19 RX ADMIN — FAMOTIDINE 20 MG: 20 TABLET, FILM COATED ORAL at 09:10

## 2020-03-19 RX ADMIN — BACLOFEN 10 MG: 10 TABLET ORAL at 09:13

## 2020-03-19 RX ADMIN — LEVETIRACETAM 500 MG: 500 TABLET ORAL at 09:11

## 2020-03-19 RX ADMIN — OXYCODONE HYDROCHLORIDE 10 MG: 10 TABLET ORAL at 13:36

## 2020-03-19 RX ADMIN — FLUTICASONE PROPIONATE 1 SPRAY: 50 SPRAY, METERED NASAL at 09:13

## 2020-03-19 RX ADMIN — OXYCODONE HYDROCHLORIDE 10 MG: 10 TABLET ORAL at 09:08

## 2020-03-19 RX ADMIN — ALLOPURINOL 100 MG: 100 TABLET ORAL at 09:16

## 2020-03-19 RX ADMIN — METHOCARBAMOL TABLETS 1500 MG: 750 TABLET, COATED ORAL at 09:13

## 2020-03-19 RX ADMIN — ALPRAZOLAM 2 MG: 1 TABLET ORAL at 21:43

## 2020-03-19 RX ADMIN — DULOXETINE HYDROCHLORIDE 60 MG: 60 CAPSULE, DELAYED RELEASE ORAL at 09:12

## 2020-03-19 RX ADMIN — SUCRALFATE 1 G: 1 TABLET ORAL at 14:34

## 2020-03-19 RX ADMIN — ALPRAZOLAM 2 MG: 1 TABLET ORAL at 13:36

## 2020-03-19 RX ADMIN — MULTIPLE VITAMINS W/ MINERALS TAB 1 TABLET: TAB at 09:17

## 2020-03-19 RX ADMIN — CARVEDILOL 3.12 MG: 3.12 TABLET, FILM COATED ORAL at 21:44

## 2020-03-19 RX ADMIN — GABAPENTIN 1200 MG: 600 TABLET, FILM COATED ORAL at 13:36

## 2020-03-19 RX ADMIN — METHOCARBAMOL TABLETS 1500 MG: 750 TABLET, COATED ORAL at 14:33

## 2020-03-19 RX ADMIN — GABAPENTIN 1200 MG: 600 TABLET, FILM COATED ORAL at 09:11

## 2020-03-19 RX ADMIN — SENNOSIDES AND DOCUSATE SODIUM 1 TABLET: 8.6; 5 TABLET ORAL at 09:10

## 2020-03-19 RX ADMIN — SODIUM CHLORIDE, PRESERVATIVE FREE 10 ML: 5 INJECTION INTRAVENOUS at 09:14

## 2020-03-19 RX ADMIN — BACLOFEN 10 MG: 10 TABLET ORAL at 13:36

## 2020-03-19 RX ADMIN — SUCRALFATE 1 G: 1 TABLET ORAL at 21:43

## 2020-03-19 RX ADMIN — ACETAMINOPHEN 650 MG: 325 TABLET ORAL at 04:04

## 2020-03-19 RX ADMIN — ACETAMINOPHEN 650 MG: 325 TABLET ORAL at 21:43

## 2020-03-19 ASSESSMENT — PAIN DESCRIPTION - DESCRIPTORS
DESCRIPTORS: ACHING;CONSTANT;BURNING;THROBBING
DESCRIPTORS: ACHING;CONSTANT;BURNING;THROBBING
DESCRIPTORS: ACHING;BURNING;CONSTANT;THROBBING
DESCRIPTORS: ACHING;CONSTANT;BURNING;THROBBING

## 2020-03-19 ASSESSMENT — PAIN DESCRIPTION - PROGRESSION
CLINICAL_PROGRESSION: NOT CHANGED

## 2020-03-19 ASSESSMENT — PAIN DESCRIPTION - LOCATION
LOCATION: BACK;LEG

## 2020-03-19 ASSESSMENT — PAIN SCALES - GENERAL
PAINLEVEL_OUTOF10: 8
PAINLEVEL_OUTOF10: 6
PAINLEVEL_OUTOF10: 6
PAINLEVEL_OUTOF10: 4
PAINLEVEL_OUTOF10: 5
PAINLEVEL_OUTOF10: 5
PAINLEVEL_OUTOF10: 6
PAINLEVEL_OUTOF10: 9
PAINLEVEL_OUTOF10: 9
PAINLEVEL_OUTOF10: 8
PAINLEVEL_OUTOF10: 8
PAINLEVEL_OUTOF10: 5
PAINLEVEL_OUTOF10: 9

## 2020-03-19 ASSESSMENT — PAIN DESCRIPTION - ONSET
ONSET: ON-GOING

## 2020-03-19 ASSESSMENT — PAIN DESCRIPTION - PAIN TYPE
TYPE: CHRONIC PAIN

## 2020-03-19 ASSESSMENT — PAIN DESCRIPTION - FREQUENCY
FREQUENCY: CONTINUOUS

## 2020-03-19 ASSESSMENT — PAIN - FUNCTIONAL ASSESSMENT
PAIN_FUNCTIONAL_ASSESSMENT: PREVENTS OR INTERFERES SOME ACTIVE ACTIVITIES AND ADLS

## 2020-03-19 NOTE — CONSULTS
cervical,  thoracic and lumbar. They are negative for acute injuries, so I see no  acute neurosurgical injuries. I do not see any evidence of an unstable  spine. He can have his activity advanced as tolerated. Follow-up  outpatient with pain management, so from a Neurosurgery standpoint, he  could be discharged.         Maya Boyer MD    D: 03/19/2020 11:54:26       T: 03/19/2020 12:03:13     [email or web address]  Job#: [id number]     Doc#: [id number]    CC:

## 2020-03-19 NOTE — CARE COORDINATION
CM Note: Met with patient regarding his inability to return to [facility]. I explained to him that he requires a facility and that I have to make choices. We discussed the following and referrals were made to: Liz Motley left for Jessy). [facility]( message left for Kalin Gill). [facility]( message left for [facility] elizabeth). [facility]. [facility] reviewing), [facility](Antwon looking at patient), [address]( Karyn looking at patient). Explained the process to the patient multiple times. He states he understands. Will await responses. The Plan for Transition of Care is related to the following treatment goals: rehab    The Patient and/or patient representative was provided with a choice of provider and agrees   with the discharge plan. [x] Yes [] No    Freedom of choice list was provided with basic dialogue that supports the patient's individualized plan of care/goals, treatment preferences and shares the quality data associated with the providers.  [x] Yes [] No

## 2020-03-19 NOTE — DISCHARGE SUMMARY
Physician Discharge Summary     Patient ID:  Dominik Winston  [id number]  24 y.o.  1955    Admit date: 3/17/2020  Discharge date and time: 3/20/20    Admitting Physician: Jessica Lopez MD     Admission Diagnoses: Fall from chair, initial encounter [W07. XXXA]  Discharge Diagnoses: Active Problems:    Accidental fall from chair    Head injury  Resolved Problems:    * No resolved hospital problems. *      Admission Condition: fair  Discharged Condition: stable    Indication for Admission: fall out of wheelchair, possible LOC, diffuse back pain with weak [address] [facility] Course/Procedures/Operation/Treatments:   3/18: presented as trauma consult for fall out of wheelchair while trying to put on pants. Hit head with possible brief LOC. Neck/back pain with worsened BUE weakness/paresthesias from baseline since fall. Pan-scan without chest CT were negative. MRI pan-spine were negative. Urology consulted for chronic figueroa needing changed and recurrent UTI's over last few months. 3/19: awaiting placement - multiple facilities rejecting patient for history of behavior issues. 3/20: awaiting placement - patient feels better with home meds, still has low back pain. Surgeries/Procedures Performed:   Figueroa exchange    Consults:   IP CONSULT TO TRAUMA SURGERY  IP CONSULT TO UROLOGY  IP CONSULT TO SOCIAL WORK  IP CONSULT TO NEUROSURGERY    Significant Diagnostic Studies:   Recent Labs:  CBC:   Lab Results   Component Value Date    WBC 4.6 03/20/2020    RBC 3.72 03/20/2020    HGB 9.1 03/20/2020    HCT 30.8 03/20/2020    MCV 82.8 03/20/2020    MCH 24.5 03/20/2020    MCHC 29.5 03/20/2020    RDW 13.2 03/20/2020     03/20/2020     BMP:    Lab Results   Component Value Date    GLUCOSE 90 03/20/2020     03/20/2020    K 3.7 03/20/2020     03/20/2020    CO2 24 03/20/2020    ANIONGAP 11 03/20/2020    BUN 10 03/20/2020    CREATININE 0.9 03/20/2020    CALCIUM 9.0 03/20/2020    LABGLOM >60 03/20/2020    GFRAA Ren NASH.    Ct Cervical Spine Wo Contrast    Result Date: 3/18/2020  PROCEDURE INFORMATION: Exam: CT Cervical Spine Without Contrast Exam date and time: 3/17/2020 11:45 PM Age: 60years old Clinical indication: Injury or trauma; Fall; Additional info: Pain TECHNIQUE: Imaging protocol: Computed tomography images of the cervical spine without contrast. Radiation optimization: All CT scans at this facility use at least one of these dose optimization techniques: automated exposure control; mA and/or kV adjustment per patient size (includes targeted exams where dose is matched to clinical indication); or iterative reconstruction. COMPARISON: No relevant prior studies available. FINDINGS: Vertebrae: No acute fracture or significant subluxation is seen. The occiput, odontoid, and C1 lateral masses are normally visualized. Degenerative changes. Multilevel degenerative changes are present in the cervical spine. Posterior hypertrophic osteophytes and uncovertebral joint hypertrophy contribute to spinal stenosis and foraminal narrowing especially at C4-C5, C5-C6, C6-C7. Anterior spinal fusion is C6-C7 is identified. Hypertrophic anterior osteophytes present at C4, C5, C6, C7. Soft tissues: Paraspinal soft tissues are within normal limits. Lungs: Lung apices are normal.     No acute fracture is seen. This report has been electronically signed by Tayler Eden MD.    Ct Thoracic Spine Wo Contrast    Result Date: 3/18/2020  PROCEDURE INFORMATION: Exam: CT Thoracic Spine Without Contrast Exam date and time: 3/17/2020 11:45 PM Age: 60years old Clinical indication: Injury or trauma; Fall; Initial encounter; Blunt trauma (contusions or hematomas);  Additional info: Pain TECHNIQUE: Imaging protocol: Computed tomography images of the thoracic spine without contrast. Radiation optimization: All CT scans at this facility use at least one of these dose optimization techniques: automated exposure control; mA and/or kV adjustment per patient size (includes targeted exams where dose is matched to clinical indication); or iterative reconstruction. COMPARISON: No relevant prior studies available. FINDINGS: Mild-to-moderate degenerative changes are observed in the thoracic spine consisting mostly of anterior bridging osteophytes. No thoracic spine fracture is seen. Spinal alignment is normal     No thoracic spine fracture. Mild-to-moderate thoracic spine spondylosis is appreciated. This report has been electronically signed by Ward Romero MD.    Ct Lumbar Spine Wo Contrast    Result Date: 3/18/2020  PROCEDURE INFORMATION: Exam: CT Lumbar Spine Without Contrast Exam date and time: 3/17/2020 11:45 PM Age: 60years old Clinical indication: Injury or trauma; Fall; Initial encounter; Blunt trauma (contusions or hematomas); Additional info: Pain TECHNIQUE: Imaging protocol: Computed tomography images of the lumbar spine without contrast. Radiation optimization: All CT scans at this facility use at least one of these dose optimization techniques: automated exposure control; mA and/or kV adjustment per patient size (includes targeted exams where dose is matched to clinical indication); or iterative reconstruction. COMPARISON: No relevant prior studies available. FINDINGS: Moderate degenerative disc disease changes are observed at L5-S1. Mild widening of the L5-S1 disc space is seen anteriorly, which appears chronic. Mild degenerative changes are seen at the other levels of the lumbar spine. No lumbar spine fracture. Spinal alignment is normal.     No lumbar spine fracture. This report has been electronically signed by Ward Romero MD.    Mri Cervical Spine Wo Contrast    Result Date: 3/18/2020  Patient MRN:  [id number] : 1955 Age: 60 years Gender: Male Order Date:  3/18/2020 4:15 AM EXAM: MRI CERVICAL SPINE WO CONTRAST INDICATION:  fall, c-spine pain. Previous neck surgery. COMPARISON: CT cervical spine without contrast 3/18/2020.  FINDINGS:  No evidence for fracture. No bone marrow edema. Diffuse degenerative changes are present. Old anterior cervical discectomy and fusion at C6/C7 with metal hardware and a solidly fused bone plug. Straightening of the lower cervical spine. The cervical cord shows smooth mild narrowing C5-T1. The STIR images demonstrate a tiny focus of increased signal in the cord at the C6/7 level, probably related to old stenosis and chronic cord injury at that level with residual myelomalacia. There is no cord swelling or evidence for mass in the cord. No syrinx. At C1/C2, there is mild dorsal spurring and no spinal stenosis or cord compression. At C2/C3, moderate right lateral spurring and sclerosis. A minimal disc bulge. Tiny left lateral spurs. Mild left facet degeneration. No significant stenosis and no cord compression. At C3/C4, there are tiny posterior lateral corner spurs. No facet degeneration. No disc herniation or subluxation and no cord compression or stenosis. At C4/C5, there is moderate to severe flattening of the disc. Moderate anterior spurs. Mild vertebral body endplate irregularities. Small posterior lateral corner spurs. No significant facet degeneration. A minimal disc bulge and bone spurs causing a mild central stenosis. No cord compression. At C5/C6, there is chronic severe flattening of the disc space with large anterior spur and small posterior spurs. Atrophic disc with mild vacuum degeneration. No disc herniation or significant disc bulge. Normal facet joints. Mild to moderate central stenosis without any cord compression or cord impingement. Moderate bilateral neural foraminal stenosis. At C6/C7, there is old anterior fusion. No edema. Solidly fused bone plug. Straightening of the lower cervical spine. Small left lateral spurs. A tiny amount of remnant posterior lateral left disc at the margin not causing any stenosis. Mild central stenosis. No significant neural foraminal stenosis.  There is currently no cord Indication:   Acute fall, lumbar pain, hx paraplegia fall, lumbar pain, hx paraplegia Comparison: CT lumbar spine 18 March 2020, 0113 hours TECHNIQUE: Multiplanar, multisequence MRI of the lumbar spine was performed without intravenous contrast. FINDINGS: Conus medullaris normal position and appearance. Normal spinal alignment with no evidence of spondylolisthesis. The intervertebral discs are degenerated at L5-S1, most notably. The bone marrow is normal in signal. The visualized soft tissues are unremarkable. At L1-2: No central stenosis or neural foraminal narrowing. At L2-3: No central stenosis or neural foraminal narrowing. At L3-4: No central stenosis or neural foraminal narrowing. At L4-5: No central stenosis or neural foraminal narrowing. Degenerative facet arthropathy noted. At L5-S1: No central stenosis or neural foraminal narrowing. Degenerative facet arthropathy noted. Degenerative spondylotic changes. No fracture or dislocation. No other significant abnormal findings. Ct Abdomen Pelvis W Iv Contrast Additional Contrast? None    Result Date: 3/18/2020  PROCEDURE INFORMATION: Exam: CT Abdomen And Pelvis With Contrast Exam date and time: 3/17/2020 11:45 PM Age: 60years old Clinical indication: Injury or trauma; Fall; Initial encounter; Blunt; Additional info: Abdominal pain TECHNIQUE: Imaging protocol: Computed tomography of the abdomen and pelvis with intravenous contrast. Radiation optimization: All CT scans at this facility use at least one of these dose optimization techniques: automated exposure control; mA and/or kV adjustment per patient size (includes targeted exams where dose is matched to clinical indication); or iterative reconstruction. Contrast material: ISOVUE 370; Contrast volume: 110 ml; Contrast route: IV;  COMPARISON: No relevant prior studies available. FINDINGS: Tubes, catheters and devices: A catheter is present in the urinary bladder.  Lungs: Numerous subcentimeter nodules are seen in both lungs. Bibasilar atelectasis is also noted. Heart: The heart is normal in size. A small 2.8 cm left pericardial cyst versus pericardial diverticulum is appreciated. A small pericardial effusion is also noted. Liver: The liver demonstrates a nodular surface contour suggestive of cirrhosis. No parenchymal lesion is seen. Gallbladder and bile ducts: Normal. No calcified stones. No ductal dilation. Pancreas: Normal. No ductal dilation. Spleen: The spleen is mildly enlarged. Adrenals: Normal. No mass. Kidneys and ureters: Normal. No hydronephrosis. Stomach and bowel: A large amount of stool is present in the colon. No intestinal obstruction. Appendix: The appendix is normal. Intraperitoneal space: Unremarkable. No free air. No significant fluid collection. Vasculature: Unremarkable. No abdominal aortic aneurysm. Lymph nodes: Unremarkable. No enlarged lymph nodes. Bladder: Urinary bladder wall thickening is appreciated. Reproductive: Unremarkable as visualized. Bones/joints: Degenerative changes are observed in the lumbar spine and visualized thoracic spine. No acute fracture. Chronic AVN changes are present in the right femoral head. Soft tissues: A small left indirect inguinal hernia containing fat is noted. 1. No evidence of acute traumatic injury in the abdomen or pelvis. 2. Possible hepatic cirrhosis, correlate with LFTs. Mild splenomegaly is noted. 3. Numerous bibasilar subcentimeter nodules. Correlate with prior imaging if available, or consider CT scan of the thorax for further evaluation. 4. Constipation. 5. Possible cystitis, correlate with urinalysis. 6. Mild distal esophagitis.  This report has been electronically signed by Roslyn Cesar MD.    Xr Chest Portable    Result Date: 3/18/2020  Patient MRN:  [id number] : 1955 Age: 60 years Gender: Male Order Date:  3/17/2020 11:30 PM EXAM: XR CHEST PORTABLE one image INDICATION:  fall fall COMPARISON: 3/13/2015 FINDINGS: There is borderline cardiomegaly with the prominence of the superior mediastinum. There is minimal atelectasis in the lung bases. Tiny pleural effusions are suspected. There is no focal consolidation or pneumothorax. Borderline cardiac size with minimal atelectasis in the lung bases. Discharge Exam:     I/O last 3 completed shifts: In: 5 [P.O.:720]  Out: 2800 [Urine:2800]     Gen - no apparent distress   Neuro - Awake, alert, attentive    HEENT - PERRL 3mm   Lungs - non labored,   Heart - RR   Abdomen - snt  Spine -   Mild tenderness   SkIn CDI       Disposition: SNF    In process/preliminary results:  Outstanding Order Results     Date and Time Order Name Status Description    3/18/2020 2154 Culture, Urine Preliminary           Patient Instructions:   Discharge Medication List as of 3/20/2020  2:45 PM           Details   oxyCODONE (ROXICODONE) 5 MG immediate release tablet Take 2 tablets by mouth every 6 hours as needed for Pain for up to 5 days. , Disp-20 tablet, R-0Print              Details   allopurinol (ZYLOPRIM) 100 MG tablet Take 100 mg by mouth dailyHistorical Med      ALPRAZolam (XANAX) 1 MG tablet Take 2 mg by mouth 3 times daily. Historical Med      apixaban (ELIQUIS) 5 MG TABS tablet Take 5 mg by mouthHistorical Med      baclofen (LIORESAL) 10 MG tablet Take 10 mg by mouth 3 times dailyHistorical Med      carvedilol (COREG) 3.125 MG tablet Take 3.125 mg by mouth 2 times dailyHistorical Med      cyanocobalamin 1000 MCG tablet Take 1,000 mcg by mouth dailyHistorical Med      sennosides-docusate sodium (SENOKOT-S) 8.6-50 MG tablet Take 1 tablet by mouth 2 times dailyHistorical Med      DULoxetine (CYMBALTA) 60 MG extended release capsule Take 60 mg by mouth dailyHistorical Med      famotidine (PEPCID) 20 MG tablet Take 20 mg by mouth 2 times dailyHistorical Med      fluticasone (FLONASE) 50 MCG/ACT nasal spray 1 spray by Each Nostril route dailyHistorical Med      gabapentin (NEURONTIN) 600 MG tablet Take 1,200 mg by mouth 3 times daily. Historical Med      levETIRAcetam (KEPPRA) 500 MG tablet Take 500 mg by mouth dailyHistorical Med      Multiple Vitamins-Minerals (THERAPEUTIC MULTIVITAMIN-MINERALS) tablet Take 1 tablet by mouth dailyHistorical Med      nitrofurantoin, macrocrystal-monohydrate, (MACROBID) 100 MG capsule Take 100 mg by mouth dailyHistorical Med      perphenazine 2 MG tablet Take 2 mg by mouth 2 times dailyHistorical Med      predniSONE (DELTASONE) 10 MG tablet Take 10 mg by mouth dailyHistorical Med      Rosuvastatin Calcium 20 MG CPSP Take 20 mg by mouth nightlyHistorical Med      sucralfate (CARAFATE) 1 GM tablet Take 1 g by mouth 4 times dailyHistorical Med      tamsulosin (FLOMAX) 0.4 MG capsule Take 0.4 mg by mouth dailyHistorical Med      albuterol sulfate HFA (VENTOLIN HFA) 108 (90 Base) MCG/ACT inhaler Inhale 2 puffs into the lungs every 4 hours as needed for WheezingHistorical Med      ibuprofen (ADVIL;MOTRIN) 400 MG tablet Take 400 mg by mouth every 6 hours as needed for PainHistorical Med      NONFORMULARY Historical Med             Hospital/Incidental Findings Requiring Follow-Up:  none    TRAUMA SERVICES DISCHARGE INSTRUCTIONS    Call [phone], option 2, for any questions/concerns and for follow-up appointment only as needed    Please follow the instructions checked below:    Please follow-up with your primary care provider. ACTIVITY INSTRUCTIONS  Increase activity as tolerated    PAIN / MEDICATION INSTRUCTIONS  Apply to areas of pain: Ice packs for first 24 hours, hot packs after that. Take medication as prescribed. You may take Ibuprofen or Tylenol (over the counter) as directed for mild pain. --You may take up to 800mg of Ibuprofen every 8 hours for pain, please take with food or milk. --Do NOT take more than 4000mg of Tylenol in 24h. WORK:  You may return to work when you feel ready.     Call the trauma clinic for any of the following or for questions/concerns;  --fever over

## 2020-03-19 NOTE — PROGRESS NOTES
ASSESSMENT:    Comments:  Patient requested minimal people in the room. Patient was seen this date for PT evaluation with OT collaboration. Patient was agreeable to evaluation. Results of the functional assessment are noted above. Upon entering the room patient was found supine in bed. . At end of session, patient in bed with  call light and phone within reach,  all lines and tubes intact, nursing notified. This patient can benefit from the continuation of skilled PT possibly in rehab setting to maximize functional level and return to PLOF. Pt's/ family goals   1. To go to a facility for rehab process. Patient and or family understand(s) diagnosis, prognosis, and plan of care. yes    PLAN:    PT care will be provided in accordance with the objectives noted above. Exercises and functional mobility practice will be used as well as appropriate assistive devices or modalities to obtain goals. Patient and family education will also be administered as needed. Frequency of treatments: 2-5x/week x 1-2 weeks. Time in  0841  Time out  0855    Total Treatment Time  15 minutes     Evaluation Time includes thorough review of current medical information, gathering information on past medical history/social history and prior level of function, completion of standardized testing/informal observation of tasks, assessment of data and education on plan of care and goals.     CPT codes:  [x] Low Complexity PT evaluation 38324  [] Moderate Complexity PT evaluation 83546  [] High Complexity PT evaluation 55487  [] PT Re-evaluation 02876  [] Gait training 27911 - minutes  [] Manual therapy [address] [facility] - minutes  [] Therapeutic activities 06393 - minutes  [] Therapeutic exercises 28413 - minutes  [] Neuromuscular reeducation 47207 - minutes     Hina Cifuentes, [address] [facility]

## 2020-03-19 NOTE — PROGRESS NOTES
movement, no respiratory distress  Abdomen: soft, non-tender, non-distended  Genitourinary: figueroa intact draining clear yellow urine   Extremities: no cyanosis, clubbing or edema         Labs:     Recent Labs     03/19/20  0353      K 3.6      CO2 26   BUN 10   CREATININE 1.0   GLUCOSE 87   CALCIUM 8.8       Lab Results   Component Value Date    HGB 9.4 03/19/2020    HCT 31.8 03/19/2020         Assessment/Plan:  Neurogenic bladder with indwelling figueroa  Hx of urethral stricture s/p cystoscopy, urethral dilation, and figueroa placement on 2/5/20 with Dr. Vinnie Ashley    He has had good urine output since figueroa insertion  Bladder scans have been low   Continue figueroa  This will need changed every 3-4 weeks  Urine culture pending   No further  interventions planned at this time  He will follow up with Dr. Vinnie Ashley in [place] upon discharge       BEST Villarreal - CNP   [facility]  Urology

## 2020-03-19 NOTE — CARE COORDINATION
CM Note; per Linda Route at [facility], Unable to accept patient at this time. Referral sent to [place] Osei at [employer]. Will await response.

## 2020-03-19 NOTE — PROGRESS NOTES
TBA  Stand by Assist    Functional Mobility NA  NA   Balance Sitting:     Static:  TBA    Dynamic:TBA  Standing: NA     Activity Tolerance Poor+ with lt. Ax. Fair with lt. Ax. Visual/  Perceptual Glasses: reading        Safety Awareness fair                    good     Hand dominance: R     Strength ROM Additional Info:    RUE  4/5  WFL good  and wfl FMC/dexterity noted during ADL tasks     LUE 4/5  WFL good  and wfl FMC/dexterity noted during ADL tasks       Hearing: [facility]   Sensation:  Pt. c/o burning, numbness/ tingling B hands/feet  Tone: WFL B UE  Edema: none noted B UEs                            Comments: Upon arrival, patient supine in bed, cleared by Nursing & NS consulted, agreeable to OT, however requesting minimal number of people in room at one time. Pt demonstrating fair+ understanding of education/techniques, requiring additional training / education. At end of session, patient supine/elevated in bed with call light and phone within reach, all lines and tubes intact. Pt would benefit from continued skilled OT to increase safety and independence with completion of ADL/iADL tasks, functional transfers/mobility to improve independence and quality of life. Treatment:  OT services provided include: facilitation of bed mobility, skilled monitoring of vitals & pt.'s response to treatment, instruction/training on safe & adapted techniques within precautions for completion of ADLs, proper posture and/or positioning,during ADLs and functional ax., technique, precautions. Pt.  also Instructed RE: safe transfers/mobility, ADL training, role of OT, E.C. techniques, treatment plan, recs. , prec.      Eval Complexity: Low    (Evaluation time includes thorough review of current medical information, gathering information on past medical history/social history and prior level of function, completion of standardized testing/informal observation of tasks, assessment of data, consultation with other medical professions/disciplines, and development of POC/Goals.)      Assessment of current deficits  Functional mobility [x]  ADLs [x] Strength [x]  Cognition []  Functional transfers  [x] IADLs [x] Safety Awareness [x]  Endurance [x]  Fine Motor Coordination [] Balance [x] Vision/perception [] Sensation []   Gross Motor Coordination [] ROM [] Delirium []                  Motor Control []    Plan of Care: OT for 2-5 x/wk. ADL retraining [x]   Equipment needs [x]   Neuromuscular re-education [x] Energy Conservation Techniques [x]  Functional Transfer training [x] Patient and/or Family Education [x]  Functional Mobility training [x]  Environmental Modifications [x]  Cognitive re-training []   Compensatory techniques for ADLs [x]  Splinting Needs []   Positioning to improve overall function [x]   Therapeutic Activity [x]  Therapeutic Exercise  [x]  Visual/Perceptual: []    Delirium prevention/treatment  []   Other:  []    Rehab Potential: Good for established goals     Patient / Family Goal: to return to [facility]    Patient and/or family were instructed diagnosis, prognosis/goals and plan of care. Demonstrated fair understanding. [] Malnutrition indicators have been identified and nursing has been notified to ensure a dietitian consult is ordered. low Evaluation only     Treatment Time In: 8:25           Treatment Time Out: 8:40               Treatment Charges: Mins Units   Ther Ex  64430     Manual Therapy 13122     Thera Activities 50176     ADL/Home Mgt 77487     Neuro Re-ed 77660     Group Therapy      Orthotic manage/training  84361     Non-Billable Time     Total Timed Treatment 15            BRIAN Anderson/L   License #  [id number]

## 2020-03-19 NOTE — PROGRESS NOTES
1930 Assumed patient care at this time. Patient resting in bed with reports of pain. Will medicate as requested per MAR. No complaints of respiratory distress at this time. IV capped. GABRIEL Serrano secured, patent and draining. Bed locked in lowest position. Bed alarm applied and functioning. Call bell and belongings within reach. Will continue to monitor.

## 2020-03-20 VITALS
RESPIRATION RATE: 16 BRPM | OXYGEN SATURATION: 96 % | WEIGHT: 215 LBS | DIASTOLIC BLOOD PRESSURE: 71 MMHG | TEMPERATURE: 98 F | BODY MASS INDEX: 32.58 KG/M2 | HEART RATE: 86 BPM | SYSTOLIC BLOOD PRESSURE: 130 MMHG | HEIGHT: 68 IN

## 2020-03-20 LAB
ALBUMIN SERPL-MCNC: 3 G/DL (ref 3.5–5.2)
ALP BLD-CCNC: 257 U/L (ref 40–129)
ALT SERPL-CCNC: 11 U/L (ref 0–40)
ANION GAP SERPL CALCULATED.3IONS-SCNC: 11 MMOL/L (ref 7–16)
AST SERPL-CCNC: 19 U/L (ref 0–39)
BILIRUB SERPL-MCNC: 0.3 MG/DL (ref 0–1.2)
BUN BLDV-MCNC: 10 MG/DL (ref 8–23)
CALCIUM SERPL-MCNC: 9 MG/DL (ref 8.6–10.2)
CHLORIDE BLD-SCNC: 105 MMOL/L (ref 98–107)
CO2: 24 MMOL/L (ref 22–29)
CREAT SERPL-MCNC: 0.9 MG/DL (ref 0.7–1.2)
GFR AFRICAN AMERICAN: >60
GFR NON-AFRICAN AMERICAN: >60 ML/MIN/1.73
GLUCOSE BLD-MCNC: 90 MG/DL (ref 74–99)
HCT VFR BLD CALC: 30.8 % (ref 37–54)
HEMOGLOBIN: 9.1 G/DL (ref 12.5–16.5)
MCH RBC QN AUTO: 24.5 PG (ref 26–35)
MCHC RBC AUTO-ENTMCNC: 29.5 % (ref 32–34.5)
MCV RBC AUTO: 82.8 FL (ref 80–99.9)
PDW BLD-RTO: 13.2 FL (ref 11.5–15)
PLATELET # BLD: 191 E9/L (ref 130–450)
PMV BLD AUTO: 10.4 FL (ref 7–12)
POTASSIUM REFLEX MAGNESIUM: 3.7 MMOL/L (ref 3.5–5)
RBC # BLD: 3.72 E12/L (ref 3.8–5.8)
SODIUM BLD-SCNC: 140 MMOL/L (ref 132–146)
TOTAL PROTEIN: 5.9 G/DL (ref 6.4–8.3)
WBC # BLD: 4.6 E9/L (ref 4.5–11.5)

## 2020-03-20 PROCEDURE — G0378 HOSPITAL OBSERVATION PER HR: HCPCS

## 2020-03-20 PROCEDURE — 6360000002 HC RX W HCPCS: Performed by: SURGERY

## 2020-03-20 PROCEDURE — 96372 THER/PROPH/DIAG INJ SC/IM: CPT

## 2020-03-20 PROCEDURE — 2580000003 HC RX 258: Performed by: SURGERY

## 2020-03-20 PROCEDURE — 85027 COMPLETE CBC AUTOMATED: CPT

## 2020-03-20 PROCEDURE — 36415 COLL VENOUS BLD VENIPUNCTURE: CPT

## 2020-03-20 PROCEDURE — 99224 PR SBSQ OBSERVATION CARE/DAY 15 MINUTES: CPT | Performed by: SURGERY

## 2020-03-20 PROCEDURE — 80053 COMPREHEN METABOLIC PANEL: CPT

## 2020-03-20 PROCEDURE — 6370000000 HC RX 637 (ALT 250 FOR IP): Performed by: SURGERY

## 2020-03-20 RX ORDER — OXYCODONE HYDROCHLORIDE 5 MG/1
10 TABLET ORAL EVERY 6 HOURS PRN
Qty: 20 TABLET | Refills: 0 | Status: SHIPPED | OUTPATIENT
Start: 2020-03-20 | End: 2020-03-25

## 2020-03-20 RX ADMIN — ENOXAPARIN SODIUM 30 MG: 30 INJECTION SUBCUTANEOUS at 09:52

## 2020-03-20 RX ADMIN — OXYCODONE 5 MG: 5 TABLET ORAL at 02:57

## 2020-03-20 RX ADMIN — OXYCODONE 5 MG: 5 TABLET ORAL at 07:04

## 2020-03-20 RX ADMIN — ACETAMINOPHEN 650 MG: 325 TABLET ORAL at 02:57

## 2020-03-20 RX ADMIN — SODIUM CHLORIDE, PRESERVATIVE FREE 10 ML: 5 INJECTION INTRAVENOUS at 09:53

## 2020-03-20 RX ADMIN — OXYCODONE 5 MG: 5 TABLET ORAL at 11:20

## 2020-03-20 RX ADMIN — METHOCARBAMOL TABLETS 1500 MG: 750 TABLET, COATED ORAL at 09:54

## 2020-03-20 RX ADMIN — FLUTICASONE PROPIONATE 1 SPRAY: 50 SPRAY, METERED NASAL at 09:53

## 2020-03-20 ASSESSMENT — PAIN DESCRIPTION - PAIN TYPE
TYPE: CHRONIC PAIN

## 2020-03-20 ASSESSMENT — PAIN DESCRIPTION - LOCATION
LOCATION: BACK;NECK
LOCATION: BACK;LEG
LOCATION: BACK;LEG
LOCATION: BACK;NECK

## 2020-03-20 ASSESSMENT — PAIN SCALES - GENERAL
PAINLEVEL_OUTOF10: 10
PAINLEVEL_OUTOF10: 9
PAINLEVEL_OUTOF10: 0
PAINLEVEL_OUTOF10: 10
PAINLEVEL_OUTOF10: 8
PAINLEVEL_OUTOF10: 8
PAINLEVEL_OUTOF10: 9
PAINLEVEL_OUTOF10: 8

## 2020-03-20 ASSESSMENT — PAIN DESCRIPTION - PROGRESSION
CLINICAL_PROGRESSION: NOT CHANGED
CLINICAL_PROGRESSION: NOT CHANGED

## 2020-03-20 ASSESSMENT — PAIN - FUNCTIONAL ASSESSMENT
PAIN_FUNCTIONAL_ASSESSMENT: PREVENTS OR INTERFERES SOME ACTIVE ACTIVITIES AND ADLS

## 2020-03-20 ASSESSMENT — PAIN DESCRIPTION - ORIENTATION
ORIENTATION: RIGHT
ORIENTATION: RIGHT
ORIENTATION: POSTERIOR
ORIENTATION: POSTERIOR

## 2020-03-20 ASSESSMENT — PAIN DESCRIPTION - FREQUENCY
FREQUENCY: CONTINUOUS
FREQUENCY: CONTINUOUS

## 2020-03-20 ASSESSMENT — PAIN DESCRIPTION - ONSET
ONSET: ON-GOING
ONSET: ON-GOING

## 2020-03-20 ASSESSMENT — PAIN DESCRIPTION - DESCRIPTORS
DESCRIPTORS: ACHING;DULL;DISCOMFORT
DESCRIPTORS: ACHING;DISCOMFORT;DULL

## 2020-03-20 NOTE — PROGRESS NOTES
Pt was angry about his pain medications being changed with 10mg of Oxy discontinued. He refused to take all morning medications for me.

## 2020-03-20 NOTE — CARE COORDINATION
CM Note: Update clinical given and faxed to [facility] at [facility]. Elena notified of patient discharging on lovenox. [facility] to  patient between 2-2:30pm today for transport. Envelope completed.

## 2020-03-20 NOTE — PROGRESS NOTES
TRAUMA SURGERY  ATTENDING PROGRESS NOTE      CC: fall     S:   soar     O:   Vitals:    03/19/20 1545 03/20/20 0023 03/20/20 0455 03/20/20 0845   BP: 128/66 125/69 128/71 130/71   Pulse: 88 76 80 86   Resp: 16 16 16 16   Temp: 98.7 °F (37.1 °C) 98.6 °F (37 °C) 98.7 °F (37.1 °C) 98 °F (36.7 °C)   TempSrc: Temporal Temporal Temporal Temporal   SpO2: 95% 95%  96%   Weight:       Height:           I/O last 3 completed shifts: In: 5 [P.O.:720]  Out: 2800 [Urine:2800]    Gen - no apparent distress   Neuro - Awake, alert, attentive    HEENT - PERRL 3mm   Lungs - non labored,   Heart - RR   Abdomen - snt  Spine -   Mild tenderness   SkIn CDI    A/P:   Fall out of wheel chair with spine pain neg imaging,     Active Problems:    Accidental fall from chair    Head injury  Resolved Problems:    * No resolved hospital problems.  *      - spine pain neg imaging NS no plans   - home meds   - discharge       DVT prophylaxis: , Ayah Mckeon MD FACS

## 2020-03-20 NOTE — DISCHARGE INSTR - COC
Continuity of Care Form    Patient Name: Cely Sanon   :  1955  MRN:  [id number]    Admit date:  3/17/2020  Discharge date:  2020    Code Status Order: Full Code   Advance Directives:     Admitting Physician:  Clay Louise MD  PCP: No primary care provider on file. Discharging Nurse: Armani Barger Unit/Room#: 0627/9419-R  Discharging Unit Phone Number: [phone]    Emergency Contact:   Extended Emergency Contact Information  Primary Emergency Contact: Oxana Padilla  Address: [address] [place] [address]  [address] Phone: [phone]  Relation: Brother/Sister    Past Surgical History:  Past Surgical History:   Procedure Laterality Date    BACK SURGERY         Immunization History: There is no immunization history on file for this patient. Active Problems:  Patient Active Problem List   Diagnosis Code    Accidental fall from chair W07. [place]    Head injury S09.90XA       Isolation/Infection:   Isolation          No Isolation        Patient Infection Status     None to display          Nurse Assessment:  Last Vital Signs: /71   Pulse 86   Temp 98 °F (36.7 °C) (Temporal)   Resp 16   Ht 5' 8\" (1.727 m)   Wt 215 lb (97.5 kg)   SpO2 96%   BMI 32.69 kg/m²     Last documented pain score (0-10 scale): Pain Level: 8  Last Weight:   Wt Readings from Last 1 Encounters:   20 215 lb (97.5 kg)     Mental Status:  oriented    IV Access:  - None    Nursing Mobility/ADLs:  Walking   Dependent  Transfer  Assisted  Bathing  Assisted  Dressing  Independent  Toileting  Assisted  Feeding  Independent  Med Admin  Independent  Med Delivery   none    Wound Care Documentation and Therapy:        Elimination:  Continence:   · Bowel: No  · Bladder: Yes  Urinary Catheter: Chronic Serrano   Colostomy/Ileostomy/Ileal Conduit: No       Date of Last BM: 20    Intake/Output Summary (Last 24 hours) at 3/20/2020 1407  Last data filed at 3/20/2020 1352  Gross per 24 hour   Intake 480 ml   Output 2700 ml   Net -2220 ml     I/O last 3 completed shifts: In: 5 [P.O.:720]  Out: 2800 [Urine:2800]    Safety Concerns: At Risk for Falls    Impairments/Disabilities:      None    Nutrition Therapy:  Current Nutrition Therapy:   - Oral Diet:  General    Routes of Feeding: Oral  Liquids: No Restrictions  Daily Fluid Restriction: no  Last Modified Barium Swallow with Video (Video Swallowing Test): not done    Treatments at the Time of Hospital Discharge:   Respiratory Treatments: N/A  Oxygen Therapy:  is not on home oxygen therapy.   Ventilator:    - No ventilator support    Rehab Therapies: Physical Therapy and Occupational Therapy  Weight Bearing Status/Restrictions: No weight bearing restirctions  Other Medical Equipment (for information only, NOT a DME order):  {EQUIPMENT:[id number]  Other Treatments: Wheelchair     Patient's personal belongings (please select all that are sent with patient):  None    RN SIGNATURE:  Electronically signed by Marta Rao RN on 3/20/20 at 2:10 PM EDT    CASE MANAGEMENT/SOCIAL WORK SECTION    Inpatient Status Date: ***    Readmission Risk Assessment Score:  Readmission Risk              Risk of Unplanned Readmission:        0           Discharging to Facility/ Agency   · Name:   · Address:  · Phone:  · Fax:    Dialysis Facility (if applicable)   · Name:  · Address:  · Dialysis Schedule:  · Phone:  · Fax:    / signature: {Esignature:[id number]}    PHYSICIAN SECTION    Prognosis: {Prognosis:7152786447}    Condition at Discharge: [address] Patient Condition:602987026}    Rehab Potential (if transferring to Rehab): {Prognosis:9737976241}    Recommended Labs or Other Treatments After Discharge: ***    Physician Certification: I certify the above information and transfer of Hunter Reynolds  is necessary for the continuing treatment of the diagnosis listed and that he requires {Admit to Appropriate Level of Care:96339} for {GREATER/LESS:816993759} 30 days.      Update Admission H&P: {CHP DME Changes in JUK:032966618}    PHYSICIAN SIGNATURE:  {Esignature:240096353}

## 2020-03-20 NOTE — PLAN OF CARE
Problem: Falls - Risk of:  Goal: Will remain free from falls  Description: Will remain free from falls  3/20/2020 1424 by Max Euceda RN  Outcome: Met This Shift  3/20/2020 0035 by Davide Soria RN  Outcome: Met This Shift  Goal: Absence of physical injury  Description: Absence of physical injury  3/20/2020 1424 by Max Euceda RN  Outcome: Met This Shift  3/20/2020 0035 by Davide Soria RN  Outcome: Met This Shift     Problem: Skin Integrity:  Goal: Will show no infection signs and symptoms  Description: Will show no infection signs and symptoms  3/20/2020 1424 by Max Euceda RN  Outcome: Met This Shift  3/20/2020 0035 by Davide Soria RN  Outcome: Not Met This Shift     Problem: Musculor/Skeletal Functional Status  Goal: Absence of falls  3/20/2020 1424 by Max Euceda RN  Outcome: Met This Shift  3/20/2020 0035 by Davide Soria RN  Outcome: Met This Shift     Problem: Skin Integrity:  Goal: Absence of new skin breakdown  Description: Absence of new skin breakdown  3/20/2020 1424 by Max Euceda RN  Outcome: Ongoing  3/20/2020 0035 by Davide Soria RN  Outcome: Met This Shift     Problem: Pain:  Goal: Pain level will decrease  Description: Pain level will decrease  3/20/2020 1424 by Max Euceda RN  Outcome: Ongoing  3/20/2020 0035 by Davide Soria RN  Outcome: Met This Shift  Goal: Control of acute pain  Description: Control of acute pain  3/20/2020 1424 by Max Euceda RN  Outcome: Ongoing  3/20/2020 0035 by Davide Soria RN  Outcome: Met This Shift  Goal: Control of chronic pain  Description: Control of chronic pain  3/20/2020 1424 by Max Euceda RN  Outcome: Ongoing  3/20/2020 0035 by Davide Soria RN  Outcome: Met This Shift     Problem: Mobility - Impaired:  Goal: Mobility will improve  Description: Mobility will improve  3/20/2020 1424 by Max Euceda RN  Outcome: Ongoing  3/20/2020 0035 by

## 2020-03-21 LAB — URINE CULTURE, ROUTINE: NORMAL

## 2020-04-07 PROBLEM — M54.9 BACK PAIN: Status: ACTIVE | Noted: 2020-04-07

## 2021-01-09 PROBLEM — J18.9 PNEUMONIA: Status: ACTIVE | Noted: 2021-01-09

## 2021-01-09 PROBLEM — L89.90 DECUBITUS ULCER: Status: ACTIVE | Noted: 2021-01-09

## 2021-01-09 PROBLEM — I10 HTN (HYPERTENSION): Status: ACTIVE | Noted: 2021-01-09

## 2021-01-09 PROBLEM — R78.81 MRSA BACTEREMIA: Status: ACTIVE | Noted: 2021-01-09

## 2021-01-09 PROBLEM — J44.9 COPD (CHRONIC OBSTRUCTIVE PULMONARY DISEASE) (HCC): Status: ACTIVE | Noted: 2021-01-09

## 2021-01-09 PROBLEM — N40.0 BPH (BENIGN PROSTATIC HYPERPLASIA): Status: ACTIVE | Noted: 2021-01-09

## 2021-01-09 PROBLEM — B95.62 MRSA BACTEREMIA: Status: ACTIVE | Noted: 2021-01-09

## 2021-01-09 PROBLEM — Z87.828 H/O SPINAL CORD INJURY: Status: ACTIVE | Noted: 2019-12-09

## 2021-01-09 PROBLEM — N39.0 UTI (URINARY TRACT INFECTION): Status: ACTIVE | Noted: 2021-01-09

## 2021-01-09 PROBLEM — D62 ACUTE ON CHRONIC BLOOD LOSS ANEMIA: Status: ACTIVE | Noted: 2020-02-10

## 2021-02-08 PROBLEM — N39.0 UTI (URINARY TRACT INFECTION): Status: RESOLVED | Noted: 2021-01-09 | Resolved: 2021-02-08
